# Patient Record
Sex: FEMALE | Race: WHITE | NOT HISPANIC OR LATINO | URBAN - METROPOLITAN AREA
[De-identification: names, ages, dates, MRNs, and addresses within clinical notes are randomized per-mention and may not be internally consistent; named-entity substitution may affect disease eponyms.]

---

## 2017-01-16 ENCOUNTER — ALLSCRIPTS OFFICE VISIT (OUTPATIENT)
Dept: OTHER | Facility: OTHER | Age: 52
End: 2017-01-16

## 2017-01-16 DIAGNOSIS — Z79.891 LONG TERM CURRENT USE OF OPIATE ANALGESIC: ICD-10-CM

## 2017-01-16 DIAGNOSIS — G89.4 CHRONIC PAIN SYNDROME: ICD-10-CM

## 2017-01-16 DIAGNOSIS — F11.20 UNCOMPLICATED OPIOID DEPENDENCE (HCC): ICD-10-CM

## 2017-01-17 ENCOUNTER — GENERIC CONVERSION - ENCOUNTER (OUTPATIENT)
Dept: OTHER | Facility: OTHER | Age: 52
End: 2017-01-17

## 2017-04-06 ENCOUNTER — ALLSCRIPTS OFFICE VISIT (OUTPATIENT)
Dept: OTHER | Facility: OTHER | Age: 52
End: 2017-04-06

## 2017-06-29 ENCOUNTER — ALLSCRIPTS OFFICE VISIT (OUTPATIENT)
Dept: OTHER | Facility: OTHER | Age: 52
End: 2017-06-29

## 2017-07-27 ENCOUNTER — GENERIC CONVERSION - ENCOUNTER (OUTPATIENT)
Dept: OTHER | Facility: OTHER | Age: 52
End: 2017-07-27

## 2017-07-31 ENCOUNTER — GENERIC CONVERSION - ENCOUNTER (OUTPATIENT)
Dept: OTHER | Facility: OTHER | Age: 52
End: 2017-07-31

## 2017-09-29 ENCOUNTER — GENERIC CONVERSION - ENCOUNTER (OUTPATIENT)
Dept: OTHER | Facility: OTHER | Age: 52
End: 2017-09-29

## 2018-01-12 NOTE — PROGRESS NOTES
Assessment    1  Chronic low back pain (724 2,338 29) (M54 5,G89 29)   2  Chronic cervical pain (723 1,338 29) (M54 2,G89 29)   3  Cervical post-laminectomy syndrome (722 81) (M96 1)   4  Chronic cervical radiculopathy (723 4) (M54 12)   5  Chronic lumbar radiculopathy (724 4) (M54 16)   6  Chronic myofascial pain (729 1,338 29) (M79 1,G89 29)   7  Chronic pain in shoulder, left (719 41,338 29) (M25 512,G89 29)   8  Long term current use of opiate analgesic (V58 69) (Z79 891)   9  Opioid dependence, continuous (304 01) (F11 20)   10  Pain syndrome, chronic (338 4) (G89 4)   11  Postlaminectomy syndrome of lumbar region (722 83) (M96 1)    Plan   Long term current use of opiate analgesic, Opioid dependence, continuous, Pain  syndrome, chronic    · Procedure Flowsheet; Status:Complete;   Done: 27USG9355 02:36PM   Performed: In Office; FSV:47JBB3105;ZSHMRCT; For:Long term current use of opiate analgesic, Opioid dependence, continuous, Pain syndrome, chronic; Ordered By:Glenn Waddell;  Pain syndrome, chronic    · HYDROmorphone HCl - 4 MG Oral Tablet; TAKE 1 TABLET Every 6 hours PRN for  pain   Rx By: Katie Iniguez; Dispense: 30 Days ; #:110 Tablet; Refill: 0; For: Pain syndrome, chronic; SELIN = N; Print Rx    Follow-up visit in 3 months Evaluation and Treatment  Follow-up  Status: Hold For - Scheduling  Requested for: 94QGL5583  Ordered; For: Pain syndrome, chronic;  Ordered By: Katie Iniguez  Performed:   Due: 80XXU4942     Discussion/Summary    While the patient was in the office today, I discussed with the patient that at this point time since she is noting moderate and stable relief, without any significant sciatic Effexor issues, however, it is obvious that she is not needing as much of the when necessary Dilaudid, that we will continue to slowly titrate her down on the Dilaudid over the next 3 months   Our goal at the next office visit would be to decrease the Dilaudid to 2 mg every 4-6 hours and then continue to slowly and steadily titrate her down from there  The patient was agreeable and verbalized an understanding  While the patient was in the office today, an annual review of the opioid contract/agreement was conducted, the patient was agreeable to continuing the contract, and an updated contracted was thoroughly reviewed and signed by the patient in the office today  A copy of the new contract was sent home with the patient for their records  A urine drug screen was collected at today's office visit as part of our medication management protocol  The point of care testing results were appropriate for what was being prescribed  The specimen will be sent for confirmatory testing  The drug screen is medically necessary because the patient is either dependent on opioid medication or is being considered for opioid medication therapy and the results could impact ongoing or future treatment  The drug screen is to evaluate for the presences or absence of prescribed, non-prescribed, and/or illicit drugs/substances  The patient was not picked for a pill count today, but she did bring her medications as required  The patient was given a 3 month supply of prescriptions with Do Not Fill date(s) of February 5, 2016, March 4, 2016, and April 1, 2016  The risk of opioid medications, including dependence, addiction and tolerance were explained to the patient  The patient understands and agrees to use these medications only as prescribed  I have fully discussed the potential side effects of the medication with the patient, which include, but are not limited to, constipation, drowsiness, addiction, impaired judgment and risk of fatal overdose as not taken as prescribed  I have warned the patient that sharing medications is a felony  I warned against driving while taking sedating medications  At this point in time, the patient is showing no signs of addiction, abuse, diversion or suicidal ideation     Possible side effects of new medications were reviewed with the patient/guardian today  The treatment plan was reviewed with the patient/guardian  The patient/guardian understands and agrees with the treatment plan   The patient was counseled regarding instructions for management, prognosis, patient and family education, impressions, risks and benefits of treatment options and importance of compliance with treatment  total time of encounter was 25 minutes  Chief Complaint    1  Pain  Continued neck pain, stable  Right sided low back and leg pain, stable  History of Present Illness  The patient presents today for a followup office visit  She is currently being treated for her chronic cervical pain and left upper extremity/shoulder pain and radicular symptoms status post a cervical fusion as well as her right-sided low back and lower extremity radicular symptoms and is status post a lumbar laminectomy 10 months ago with Dr Neo Ricks  She reports that since her last office visit, her pain symptoms have remained relatively stable and manageable, despite the slow and steady titration down on the pain medications  She reports that her current medication regimen is providing adequate and stable relief and understands that our goal to continue to try to slowly and steadily titrate her down, and hopefully, off of the opioid medications altogether  It does appear that the patient is going to be able to continue to titrate down her medication as she does have some extra pills left from her previous prescription  Belenda Kocher presents with complaints of gradual onset of constant episodes of moderate bilateral neck and bilateral lower back pain  On a scale of 1 to 10, the patient rates the pain as 6  Review of Systems    Constitutional: no fever, no recent weight gain and no recent weight loss  Eyes: no double vision and no blurry vision  Cardiovascular: no chest pain, no palpitations and no lower extremity edema  Respiratory: no complaints of shortness of breath and no wheezing  Musculoskeletal: no difficulty walking, no muscle weakness, no joint stiffness, no joint swelling, no limb swelling`, no pain in extremity and no decreased range of motion  Neurological: no dizziness, no difficulty swallowing, no memory loss, no loss of consciousness and no seizures  Gastrointestinal: no nausea, no vomiting, no constipation and no diarrhea  Genitourinary: no difficulty initiating urine stream, no genital pain and no frequent urination  Integumentary: no complaints of skin rash  Psychiatric: no depression  Endocrine: no excessive thirst, no adrenal disease, no hypothyroidism and no hyperthyroidism  Hematologic/Lymphatic: no tendency for easy bruising and no tendency for easy bleeding  Active Problems    1  Anticoagulated on Coumadin (V58 83,V58 61) (Z51 81,Z79 01)   2  Atypical chest pain (786 59) (R07 89)   3  Cervical post-laminectomy syndrome (722 81) (M96 1)   4  Chronic cervical pain (723 1,338 29) (M54 2,G89 29)   5  Chronic cervical radiculopathy (723 4) (M54 12)   6  Chronic low back pain (724 2,338 29) (M54 5,G89 29)   7  Chronic lumbar radiculopathy (724 4) (M54 16)   8  Chronic myofascial pain (729 1,338 29) (M79 1,G89 29)   9  Chronic pain in shoulder, left (719 41,338 29) (M25 512,G89 29)   10  Clavicle pain (733 90) (M89 8X1)   11  Herniated lumbar intervertebral disc (722 10) (M51 26)   12  Joint pain, knee (719 46) (M25 569)   13  Pain syndrome, chronic (338 4) (G89 4)   14  Postlaminectomy syndrome of lumbar region (722 83) (M96 1)    Past Medical History    1  History of Encounter for long-term (current) use of high-risk medication (V58 69)   (A83 433)    The active problems and past medical history were reviewed and updated today  Surgical History    1  History of  Section    The surgical history was reviewed and updated today  Family History    1   Family history of Diabetes Mellitus (V18 0)   2  Family history of Heart Disease (V17 49)   3  Family history of Hypertension (V17 49)    The family history was reviewed and updated today  Social History    · Denied: History of Alcohol Use (History)   · Never A Smoker   · Denied: History of Tobacco Use  The social history was reviewed and updated today  The social history was reviewed and is unchanged  Current Meds   1  Coumadin TABS Recorded   2  Enoxaparin Sodium 100 MG/ML Subcutaneous Solution; Therapy: 29Ejz0614 to (Evaluate:01Jun2015) Recorded   3  HYDROmorphone HCl - 4 MG Oral Tablet; TAKE 1 TABLET Every 6 hours PRN for pain; Therapy: 81RRP3162 to (Evaluate:09Dec2015); Last Rx:09Nov2015 Ordered   4  Lopressor TABS Recorded    The medication list was reviewed and updated today  Allergies    1  No Known Drug Allergies    Vitals  Vital Signs [Data Includes: Current Encounter]    Recorded: 19BGH8603 02:53PM   Temperature 98 7 F   Heart Rate 60   Respiration 16   Systolic 991   Diastolic 88   Height 5 ft 3 in   Weight 227 lb 8 0 oz   BMI Calculated 40 3   BSA Calculated 2 05   Pain Scale 6     Physical Exam    Constitutional   General appearance: Well developed, well nourished, alert, in no distress, non-toxic and no overt pain behavior  Eyes   Sclera: anicteric   HEENT   Hearing grossly intact  Neck   Neck: Abnormal   (Patient has a surgical Aspen collar on)   Pulmonary   Respiratory effort: Even and unlabored  Cardiovascular   Examination of extremities: No edema or pitting edema present  Abdomen   Abdomen: Abnormal       Skin   Skin and subcutaneous tissue: Normal without rashes or lesions, well hydrated  Posterior cervical fusion scar is clean, dry, intact, well approximated, without any signs or symptoms of infection, erythema, and or edema  Psychiatric   Mood and affect: Mood and affect appropriate  Neurologic   Cranial nerves: Cranial nerves II-XII grossly intact      the muscle tone was normal   Musculoskeletal Tandem Gait: Intact      Results/Data  Encounter Results   Procedure Flowsheet [id number] 03:51PM      Test Name Result Flag Reference   Balbuena's Depression Inventory 7     SOAP-R 8       Procedure Flowsheet 95RTY3605 02:36PM Farrah Camargo     Test Name Result Flag Reference   Urine Drug Screen Performed Date 83MWS3569         Future Appointments    Date/Time Provider Specialty Site   04/25/2016 03:15 PM [facility] [address]     Signatures   Electronically signed by : JEF Welch; Feb 2 2016  6:56AM EST                       (Author)    Electronically signed by : Ly Castillo DO; Feb 2 2016  8:03AM EST

## 2018-01-12 NOTE — MISCELLANEOUS
Message   Recorded as Task   Date: 07/31/2017 08:05 AM, Created By: Pato Cervantes   Task Name: Miscellaneous   Assigned To: Pato Cervantes   Regarding Patient: Catracho Mireles, Status: Active   CommentGlendmayra Navarrete - 31 Jul 2017 8:05 AM     TASK CREATED  Pt LM w/service to cx appt for today  She is feeling better  Glenn Waddell - 31 Jul 2017 8:30 AM     TASK REPLIED TO: Previously Assigned To Glenn Waddell  Provider aware  Thank you  Active Problems    1  Anticoagulated on Coumadin (V58 83,V58 61) (Z51 81,Z79 01)   2  Atypical chest pain (786 59) (R07 89)   3  Cervical post-laminectomy syndrome (722 81) (M96 1)   4  Chronic cervical pain (723 1,338 29) (M54 2,G89 29)   5  Chronic cervical radiculopathy (723 4) (M54 12)   6  Chronic low back pain (724 2,338 29) (M54 5,G89 29)   7  Chronic lumbar radiculopathy (724 4) (M54 16)   8  Chronic myofascial pain (729 1,338 29) (M79 1,G89 29)   9  Chronic pain in left shoulder (719 41,338 29) (M25 512,G89 29)   10  Chronic pain in right shoulder (719 41,338 29) (M25 511,G89 29)   11  Clavicle pain (733 90) (M89 8X1)   12  Herniated lumbar intervertebral disc (722 10) (M51 26)   13  Joint pain, knee (719 46) (M25 569)   14  Long term current use of opiate analgesic (V58 69) (Z79 891)   15  Opioid dependence, continuous (304 01) (F11 20)   16  Pain syndrome, chronic (338 4) (G89 4)   17  Postlaminectomy syndrome of lumbar region (722 83) (M96 1)    Current Meds   1  Coumadin TABS (Warfarin Sodium) Recorded   2  Gabapentin 400 MG Oral Capsule; TAKE 1 CAPSULE AT BEDTIME; Therapy: 75JAQ8845 to (Evaluate:58Bsl2590)  Requested for: 48Uuh0090; Last   Rx:44Qtk6491 Ordered   3  HYDROmorphone HCl - 2 MG Oral Tablet; Take 1 tablet every 4-6 hours PRN, max 5 per   day; Therapy: 97DGR5205 to (Evaluate:99Cnm1053); Last Rx:29Jun2017 Ordered   4  Lopressor TABS (Metoprolol Tartrate) Recorded   5   Medrol 4 MG Oral Tablet Therapy Pack (MethylPREDNISolone); take as directed; Therapy: 30AXK8882 to (Evaluate:27Sxb3597)  Requested for: 24Ixh3624; Last   Rx:42Yzs8179 Ordered    Allergies    1  No Known Drug Allergies    Signatures   Electronically signed by :  Rosine Curling, ; Jul 31 2017  8:35AM EST                       (Author)

## 2018-01-14 VITALS
HEIGHT: 63 IN | WEIGHT: 245 LBS | DIASTOLIC BLOOD PRESSURE: 88 MMHG | HEART RATE: 64 BPM | BODY MASS INDEX: 43.41 KG/M2 | TEMPERATURE: 98.5 F | SYSTOLIC BLOOD PRESSURE: 144 MMHG

## 2018-01-14 VITALS
DIASTOLIC BLOOD PRESSURE: 88 MMHG | BODY MASS INDEX: 43.59 KG/M2 | TEMPERATURE: 98.4 F | WEIGHT: 246 LBS | SYSTOLIC BLOOD PRESSURE: 148 MMHG | HEIGHT: 63 IN | HEART RATE: 72 BPM

## 2018-01-15 VITALS
BODY MASS INDEX: 43.59 KG/M2 | RESPIRATION RATE: 18 BRPM | WEIGHT: 246 LBS | SYSTOLIC BLOOD PRESSURE: 152 MMHG | HEIGHT: 63 IN | DIASTOLIC BLOOD PRESSURE: 96 MMHG | HEART RATE: 68 BPM

## 2018-01-15 NOTE — MISCELLANEOUS
Message   Recorded as Task   Date: 10/25/2016 12:57 PM, Created By: Han Melendez   Task Name: Follow Up   Assigned To: [facility] [place] [facility]   Regarding Patient: Cheryle Denis, Status: In Progress   Comment:    Glenn Waddell - 25 Oct 2016 12:57 PM     TASK CREATED  Please call the patient and advise her that her b/l shoulder x-rays were normal  At this point I would recommend physical therapy  Let me know if she is interested so I can put in a script  MainSanti - 25 Oct 2016 1:36 PM     TASK EDITED  m 10/25/2016 @ 66 135 36 14  Pt calling for x-ray results  Pls cb at [facility]   Christiano Quachrachelle - 25 Oct 2016 2:41 PM     TASK EDITED  Left a detailed message on machine advising of above  As per release of info on file  Provided cb  number for pt's response  Christiano Quachrachelle - 28 Oct 2016 3:43 PM     TASK EDITED   s/w pt, advised of above  Pt verbalized understanding  No PT at this time  Confirmed ov w/ DG on 1/16/17 at 1300  Active Problems    1  Anticoagulated on Coumadin (V58 83,V58 61) (Z51 81,Z79 01)   2  Atypical chest pain (786 59) (R07 89)   3  Cervical post-laminectomy syndrome (722 81) (M96 1)   4  Chronic cervical pain (723 1,338 29) (M54 2,G89 29)   5  Chronic cervical radiculopathy (723 4) (M54 12)   6  Chronic low back pain (724 2,338 29) (M54 5,G89 29)   7  Chronic lumbar radiculopathy (724 4) (M54 16)   8  Chronic myofascial pain (729 1,338 29) (M79 1,G89 29)   9  Chronic pain in left shoulder (719 41,338 29) (M25 512,G89 29)   10  Chronic pain in right shoulder (719 41,338 29) (M25 511,G89 29)   11  Clavicle pain (733 90) (M89 8X1)   12  Herniated lumbar intervertebral disc (722 10) (M51 26)   13  Joint pain, knee (719 46) (M25 569)   14  Long term current use of opiate analgesic (V58 69) (Z79 891)   15  Opioid dependence, continuous (304 01) (F11 20)   16  Pain syndrome, chronic (338 4) (G89 4)   17  Postlaminectomy syndrome of lumbar region (582 83) (M96 1)    Current Meds   1  Coumadin TABS (Warfarin Sodium) Recorded   2  HYDROmorphone HCl - 2 MG Oral Tablet; Take 1 tablet every 4-6 hours PRN, max 5 per   day; Therapy: 31FNO5326 to (Evaluate:16Nov2016); Last Rx:17Oct2016 Ordered   3  Lopressor TABS (Metoprolol Tartrate) Recorded    Allergies    1   No Known Drug Allergies    Signatures   Electronically signed by : Tere Leger, ; Oct 28 2016  3:43PM EST                       (Author)

## 2018-01-15 NOTE — MISCELLANEOUS
Message   Recorded as Task   Date: 01/17/2017 07:18 AM, Created By: Jan Franks   Task Name: Follow Up   Assigned To: [facility] [place] [facility]   Regarding Patient: Pawan Vásquez, Status: Active   CommentCarridi Shah - 17 Jan 2017 7:18 AM     TASK CREATED  Can you please call the patient's [facility] @ [phone] to find out if her dilaudid PRN needs a pre-auth and who it needs it through? Also can you get an ID for her prescription coverage and who provides her presc coverage  Thank you  Nissa Mars - 17 Jan 2017 10:40 AM     TASK EDITED    ***FYI***  S/w pharmacist regarding above  Pharmacist stated that the pt's dilauded does not require a prior auth but her copay is 43$ Her member ID # is [id number] and the provider is Glenn Prieto - 17 Jan 2017 11:41 AM     TASK REPLIED TO: Previously Assigned To Glenn Waddell  Provider aware  Thank you  Active Problems   1  Anticoagulated on Coumadin (V58 83,V58 61) (Z51 81,Z79 01)  2  Atypical chest pain (786 59) (R07 89)  3  Cervical post-laminectomy syndrome (722 81) (M96 1)  4  Chronic cervical pain (723 1,338 29) (M54 2,G89 29)  5  Chronic cervical radiculopathy (723 4) (M54 12)  6  Chronic low back pain (724 2,338 29) (M54 5,G89 29)  7  Chronic lumbar radiculopathy (724 4) (M54 16)  8  Chronic myofascial pain (729 1,338 29) (M79 1,G89 29)  9  Chronic pain in left shoulder (719 41,338 29) (M25 512,G89 29)  10  Chronic pain in right shoulder (719 41,338 29) (M25 511,G89 29)  11  Clavicle pain (733 90) (M89 8X1)  12  Herniated lumbar intervertebral disc (722 10) (M51 26)  13  Joint pain, knee (719 46) (M25 569)  14  Long term current use of opiate analgesic (V58 69) (Z79 891)  15  Opioid dependence, continuous (304 01) (F11 20)  16  Pain syndrome, chronic (338 4) (G89 4)  17  Postlaminectomy syndrome of lumbar region (722 83) (M96 1)    Current Meds  1  Coumadin TABS (Warfarin Sodium) Recorded  2   HYDROmorphone HCl - 2 MG Oral Tablet; Take 1 tablet every 4-6 hours PRN, max 5 per   day; Therapy: 75PUK5539 to (Evaluate:65Cqe1513); Last Rx:16Jan2017 Ordered  3  Lopressor TABS (Metoprolol Tartrate) Recorded    Allergies   1   No Known Drug Allergies    Signatures   Electronically signed by : Randee Wylie RN; Jan 17 2017 11:59AM EST                       (Author)

## 2018-01-16 NOTE — RESULT NOTES
Message   Recorded as Task   Date: 04/25/2016 11:50 AM, Created By: Arnaldo Palencia   Task Name: Med Renewal Request   Assigned To: [facility] [place] [facility]   Regarding Patient: Petra Degroot, Status: Active   Comment:    Britni Ramos - 25 Apr 2016 11:50 AM     TASK CREATED  Caller: Self; Renew Medication; [phone] (Home)  S/w pt  after receiving vmlom stating she had to cancel her appt  today, but will need more meds before her next povs  Verifed with the pt  that she continues on the Hydromorphone 4mg tid to qid for pain  Pt  states she started with B/L shoulder pain and she is having an MRI today  Clarified with the pt  that per contract for narcotic refills, she would need a povs before her medications run out  That is her respondsibility and would question DG for refill before povs  DG to advise  Thanks  Next povs is on 5/4  Glenn Waddell - 25 Apr 2016 11:55 AM     TASK REPLIED TO: Previously Assigned To [facility] [place] [facility]  Please advise her as a one time exception there is a 10 day supply script of her medications available for  at the  to get her to her OV as schedfranciscod  Britni Ramos - 25 Apr 2016 4:02 PM     TASK EDITED  S/w pt  and she is aware          Signatures   Electronically signed by : Kala Allen, ; Apr 25 2016  4:02PM EST                       (Author)

## 2018-01-17 NOTE — MISCELLANEOUS
Message   Recorded as Task   Date: 07/27/2017 03:03 PM, Created By: Elmo Ayers   Task Name: Miscellaneous   Assigned To: [facility] [place] [facility]   Regarding Patient: Melo Laguerre, Status: Active   Comment:    Elmo Ayers - 27 Jul 2017 3:03 PM     TASK CREATED  Pt called the service having neck pain  I s/w Glenn he said to put pt in next available spot (which is 7/31/17)  Pt took that appt but wants to know what she should do from now until then for the pain  Glenn suggested pt speak to the nurse  Pt can be reached at   [phone]  Please call her  Thank You! Santi Quach - 27 Jul 2017 3:50 PM     TASK EDITED  s/w pt, states she has had increased pain in neck and down L shoulder, L arm to hand since tuesday  Worse yesterday, continues today  Per pt, no relief w/ rest, ice / heat  Min relief w/ dilaudid as rx'd  Pt stated that she called her surgeon b/c the pain started at the base of her surgical scar  Surgeon advised - updated imaging is required  Pt saw PCP, who advised that they cannot do anything because the pt is being followed by [facility]  Pt questioned how to proceed at this point  Confirmed ov of 7/31 w/ DG  has hydromorphone on hand #35 tabs and rx w/ DNF of 7/27  Pt stated that there are frequently delys in filling this rx due to availablity  Advised pt, will d/w DG and cb to advise  pt verbalized understanding and appreciation  Glenn Waddell - 27 Jul 2017 3:54 PM     TASK REPLIED TO: Previously Assigned To Glenn Waddell  Please advise the patient that at this point I feel there is a significant inflammatory component and I'm going to have her try Medrol Dosepak, which I will be sent to her pharmacy  She should not take any other oral NSAIDs except for significant Tylenol on this medication  I also feel there is a significant neuropathic component and to help her sleep I sent a prescription for gabapentin 400 mg one by mouth at bedtime to her pharmacy   She should proceed with her when necessary Dilaudid as prescribed and that does not seem to be helping, I do not feel increasing it would be much helpful either  She should try this since our office visit on Monday we will regroup at that point  Santi Quach - 27 Jul 2017 4:20 PM     TASK EDITED  s/w pt, advised of above  pt stated that the pcp ordered an oral steroid, "forgot to tell you that " Pt states she has also tried gabapentin in the past w/ se's - drowsiness, loss of appetite    advised by doctor to stop gabapentin  Advised pt, will cancel that rx as well  Confirmed 7/31 ov, will fu at that time  Pt verbalized understanding  DG aware  Santi Quach - 27 Jul 2017 4:23 PM     TASK EDITED  medrol dose pack and gabapentin cx'd at [facility]        Active Problems    1  Anticoagulated on Coumadin (V58 83,V58 61) (Z51 81,Z79 01)   2  Atypical chest pain (786 59) (R07 89)   3  Cervical post-laminectomy syndrome (722 81) (M96 1)   4  Chronic cervical pain (723 1,338 29) (M54 2,G89 29)   5  Chronic cervical radiculopathy (723 4) (M54 12)   6  Chronic low back pain (724 2,338 29) (M54 5,G89 29)   7  Chronic lumbar radiculopathy (724 4) (M54 16)   8  Chronic myofascial pain (729 1,338 29) (M79 1,G89 29)   9  Chronic pain in left shoulder (719 41,338 29) (M25 512,G89 29)   10  Chronic pain in right shoulder (719 41,338 29) (M25 511,G89 29)   11  Clavicle pain (733 90) (M89 8X1)   12  Herniated lumbar intervertebral disc (722 10) (M51 26)   13  Joint pain, knee (719 46) (M25 569)   14  Long term current use of opiate analgesic (V58 69) (Z79 891)   15  Opioid dependence, continuous (304 01) (F11 20)   16  Pain syndrome, chronic (338 4) (G89 4)   17  Postlaminectomy syndrome of lumbar region (722 83) (M96 1)    Current Meds   1  Coumadin TABS (Warfarin Sodium) Recorded   2  Gabapentin 400 MG Oral Capsule; TAKE 1 CAPSULE AT BEDTIME; Therapy: 64QSI7336 to (Evaluate:54Vbc3835)  Requested for: 93Tko5592; Last   Rx:27Yan4779 Ordered   3  HYDROmorphone HCl - 2 MG Oral Tablet; Take 1 tablet every 4-6 hours PRN, max 5 per   day; Therapy: 42DAO5808 to (Evaluate:27Pwo7801); Last Rx:29Jun2017 Ordered   4  Lopressor TABS (Metoprolol Tartrate) Recorded   5  Medrol 4 MG Oral Tablet Therapy Pack (MethylPREDNISolone); take as directed; Therapy: 18AWM3708 to (Evaluate:05Chj2418)  Requested for: 88Bti7299; Last   Rx:50Atp9695 Ordered    Allergies    1   No Known Drug Allergies    Signatures   Electronically signed by : Jameel Kulkarni, ; Jul 27 2017  4:24PM EST                       (Author)

## 2018-01-17 NOTE — MISCELLANEOUS
Message   Recorded as Task   Date: 09/28/2017 03:25 PM, Created By: Romero Hannah   Task Name: Miscellaneous   Assigned To: Romero Hannah   Regarding Patient: Nico Tompkins, Status: Active   Comment:    Fay Constantino - 28 Sep 2017 3:25 PM     TASK CREATED  PT called back to cx her appt for monday  She stated she was over booked  I did check and saw that  She stated she doesnt have a ride  I offered her a few other days and times I thought would work to be over booked  nothing seemed to work for her bc she would have ask her ride she kept saying  I asked her if she wanted to call her ride and see if they had and days or times that might work better that we could look at your schedule and see if we could make something work  At first she wouldnt answer me she just sat there in quite  I asked her if that would help again and she said ummm no-- his mom has lung cancer and i dont think he knows what would work  I advised her to call us when she thought she might have and answer and told her i would make you aware  She stated ok then fine bye?! At this time patient is not schedule for a f/u  Glenn Waddell - 28 Sep 2017 3:28 PM     TASK REPLIED TO: Previously Assigned To Jaymie Teixeira  Provider aware  Thank you for your efforts  Active Problems    1  Anticoagulated on Coumadin (V58 83,V58 61) (Z51 81,Z79 01)   2  Atypical chest pain (786 59) (R07 89)   3  Cervical post-laminectomy syndrome (722 81) (M96 1)   4  Chronic cervical pain (723 1,338 29) (M54 2,G89 29)   5  Chronic cervical radiculopathy (723 4) (M54 12)   6  Chronic low back pain (724 2,338 29) (M54 5,G89 29)   7  Chronic lumbar radiculopathy (724 4) (M54 16)   8  Chronic myofascial pain (729 1,338 29) (M79 1,G89 29)   9  Chronic pain in left shoulder (719 41,338 29) (M25 512,G89 29)   10  Chronic pain in right shoulder (719 41,338 29) (M25 511,G89 29)   11  Clavicle pain (733 90) (M89 8X1)   12   Herniated lumbar intervertebral disc (722 10) (M51 26)   13  Joint pain, knee (719 46) (M25 569)   14  Long term current use of opiate analgesic (V58 69) (Z79 891)   15  Opioid dependence, continuous (304 01) (F11 20)   16  Pain syndrome, chronic (338 4) (G89 4)   17  Postlaminectomy syndrome of lumbar region (722 83) (M96 1)    Current Meds   1  Coumadin TABS (Warfarin Sodium) Recorded   2  Gabapentin 400 MG Oral Capsule; TAKE 1 CAPSULE AT BEDTIME; Therapy: 52MXZ8086 to (Evaluate:44Aoh6130)  Requested for: 52Vdv8593; Last   Rx:53Qqm5697 Ordered   3  HYDROmorphone HCl - 2 MG Oral Tablet; Take 1 tablet every 4-6 hours PRN, max 5 per   day; Therapy: 32EZR0329 to (Evaluate:15Ngh1076); Last Rx:98Qxh9883 Ordered   4  Lopressor TABS (Metoprolol Tartrate) Recorded   5  Medrol 4 MG Oral Tablet Therapy Pack (MethylPREDNISolone); take as directed; Therapy: 06AQJ5750 to (Evaluate:58Cru2282)  Requested for: 97Lrt7515; Last   Rx:13Rai1352 Ordered    Allergies    1   No Known Drug Allergies    Signatures   Electronically signed by : Jack Kirk, ; Sep 29 2017  8:50AM EST                       (Author)

## 2018-01-17 NOTE — MISCELLANEOUS
Message   Recorded as Task   Date: 04/28/2016 01:04 PM, Created By: Gurjit St   Task Name: Follow Up   Assigned To: [facility]   Regarding Patient: Zechariah Norris, Status: Active   Comment:    Santi Quach - 28 Apr 2016 1:04 PM     TASK CREATED  Caller: Self; General Medical Question; [phone] (Home)  m 4/28/2016 @ 1203  10 day supply of medication for  at the office  Pt states she has not been able to  up yet  Pt is questioning - how should I take my medicine? MainSanti - 28 Apr 2016 3:51 PM     TASK EDITED  s/w pt, confirmed ov of 5/4/2016 at 1330 w/ DG  Pt states she has 10 tabs of hydromorphone 4 mg on hand  States she does not drive and has not been able to get a ride to  her rx at the   Pt is questioning how to proceed w/ her medications  Advised pt, will d/w Dr Angie Navarrete - space medication out to last until 5/4/2016 ov  Encouraged pt to find a ride / call a cab to  rx between 8-4 mon - fri      d/w Dr Angie Navarrete - agreed    S/w pt, confirmed above  pt verbalizedunderstanding  Active Problems    1  Anticoagulated on Coumadin (V58 83,V58 61) (Z51 81,Z79 01)   2  Atypical chest pain (786 59) (R07 89)   3  Cervical post-laminectomy syndrome (722 81) (M96 1)   4  Chronic cervical pain (723 1,338 29) (M54 2,G89 29)   5  Chronic cervical radiculopathy (723 4) (M54 12)   6  Chronic low back pain (724 2,338 29) (M54 5,G89 29)   7  Chronic lumbar radiculopathy (724 4) (M54 16)   8  Chronic myofascial pain (729 1,338 29) (M79 1,G89 29)   9  Chronic pain in shoulder, left (719 41,338 29) (M25 512,G89 29)   10  Clavicle pain (733 90) (M89 8X1)   11  Herniated lumbar intervertebral disc (722 10) (M51 26)   12  Joint pain, knee (719 46) (M25 569)   13  Long term current use of opiate analgesic (V58 69) (Z79 891)   14  Opioid dependence, continuous (304 01) (F11 20)   15  Pain syndrome, chronic (338 4) (G89 4)   16   Postlaminectomy syndrome of lumbar region (768 83) (M96 1)    Current Meds   1  Coumadin TABS (Warfarin Sodium) Recorded   2  Enoxaparin Sodium 100 MG/ML Subcutaneous Solution; Therapy: 15Apr2015 to (Evaluate:01Jun2015) Recorded   3  HYDROmorphone HCl - 4 MG Oral Tablet; TAKE 1 TABLET Every 6 hours PRN for pain; Therapy: 68FOO8907 to (Evaluate:05May2016); Last Rx:25Apr2016 Ordered   4  Lopressor TABS (Metoprolol Tartrate) Recorded    Allergies    1   No Known Drug Allergies    Signatures   Electronically signed by : Beatriz Nichols, ; Apr 28 2016  3:52PM EST                       (Author)

## 2018-01-18 NOTE — MISCELLANEOUS
Message   Recorded as Task   Date: 2017 10:50 AM, Created By: Cornell Cheng   Task Name: Miscellaneous   Assigned To: [facility]   Regarding Patient: Jaja Laughlin, Status: Active   Comment:    Cornell Cheng - 29 Sep 2017 10:50 AM     TASK CREATED  Pt called and left message w/ service:        Moon Cervantes      Phone: [phone]   Ext:      Pt Name: Jose Eduardo Ok     Pt : 1965      Message: QUESTION ABOUT RIDE FOR APPT AND GETTING MEDICATION UNTIL             SHE CAN SEE THE Santi Rodríguez - 29 Sep 2017 11:06 AM     TASK EDITED  s/w pt, states she is going to run out of medication on 10/5  Cannot get a ride to an ov w/ DG on 10/2 or an emergency slot on 10/3  pt is requesting that this office call her pcp and request that the pcp write a rx for 1 mo of dilauded - 1x exception  Per pt, her pcp is closer to home and has later office hours  Advised pt, it is unlikely that this office will be able to accomodate that request  Will d/w DG  Pt stated that she will probably need to change pain management doctors to someone closer to home  Advised pt, she may want to ask her pcp for some names that he would refer her to  Again, will d/w DG and cb to advise  Pt verbalized understanding   Glenn Waddell - 29 Sep 2017 11:12 AM     TASK REPLIED TO: Previously Assigned To Glenn Waddell  I can give her a 30 day script with a continued slow wean fo the dilaudid  The first 2 weeks it will be 1 PO QID and the second 2 weeks, 1 PO TID PRN  This is a one time exception and after that she will need to be seen in the office for a follow up OV or find a closer PMProvider  The script will be ready for  today  Cleo Block - 29 Sep 2017 11:32 AM     TASK EDITED  Spoke with pt made her aware that DG wants her to go off a slow wean off the diiaudid   When i told the pt that she has to come and  the script at the office she becamw very upset"how do i get their i do not drive" asked the pt if she had someone that could bring her she stated no  I explained to her that these camron medications can not be mailed or called into the pharmacy  pt states im not going to listen to the weaning schedule because i can not get the pills  Pt became upset and stated that, why cant DG call her pcp to see if they would order this medication   MainSanti - 29 Sep 2017 11:39 AM     TASK EDITED  LMOM to cb on cb number provided  **Will advise pt, this office cannot ask another provider to write a rx  It is at the providers discretion  Pt may find someone to pu her rx today w/ a weaning schedule to get her to a new pain provider  Or, she can keep her ov on Monday  or pt can call her pcp and request a rx - effectively discharging herself from [address] Glenn Rojas - 29 Sep 2017 11:42 AM     TASK REPLIED TO: Previously Assigned To [facility] Client  Provider aware  The script and a list of other pain management providers is waiting for her upfront  Thank you  Raysa Lozada - 29 Sep 2017 12:54 PM     TASK EDITED  received  Reason: ROUTINE/OFFICE   Pt's Edy Parkinson       For: OFFICE     2nd Call: NO        From: [facility]     Phone: [phone]   Ext:     Pt Name: [facility]    Pt : 1965     Message: [address]  NEEDS CALL BACK  -----------------------------------------------------------------     CALLER ID: [id number]      CSN: [id number]      Taken By: NS 2017 11:54 AM   answering service message:   Riya Lanier - 29 Sep 2017 1:04 PM     TASK EDITED  spoke to pt and made her aware  that if she can get someone to bring her to the office that the  script and a list of other area pain specalist would be their  Alos made pt aware that it is her responsiblity to call her pcp to see if she would take over providing the script for the dilauid   Active Problems    1   Anticoagulated on Coumadin (V58 83,V58 61) (Z51 81,Z79 01)   2  Atypical chest pain (786 59) (R07 89)   3  Cervical post-laminectomy syndrome (722 81) (M96 1)   4  Chronic cervical pain (723 1,338 29) (M54 2,G89 29)   5  Chronic cervical radiculopathy (723 4) (M54 12)   6  Chronic low back pain (724 2,338 29) (M54 5,G89 29)   7  Chronic lumbar radiculopathy (724 4) (M54 16)   8  Chronic myofascial pain (729 1,338 29) (M79 1,G89 29)   9  Chronic pain in left shoulder (719 41,338 29) (M25 512,G89 29)   10  Chronic pain in right shoulder (719 41,338 29) (M25 511,G89 29)   11  Clavicle pain (733 90) (M89 8X1)   12  Herniated lumbar intervertebral disc (722 10) (M51 26)   13  Joint pain, knee (719 46) (M25 569)   14  Long term current use of opiate analgesic (V58 69) (Z79 891)   15  Opioid dependence, continuous (304 01) (F11 20)   16  Pain syndrome, chronic (338 4) (G89 4)   17  Postlaminectomy syndrome of lumbar region (722 83) (M96 1)    Current Meds   1  Coumadin TABS (Warfarin Sodium) Recorded   2  HYDROmorphone HCl - 2 MG Oral Tablet; Take 1 PO QID x 2 weeks, then 1 PO TID   PRN; Therapy: 94ZWT8919 to (Diego Ou); Last Rx:[id number] Ordered   3  Lopressor TABS (Metoprolol Tartrate) Recorded    Allergies    1   No Known Drug Allergies    Signatures   Electronically signed by : Richi Esteban, ; Sep 29 2017  1:05PM EST                       (Author)

## 2018-10-17 ENCOUNTER — TELEPHONE (OUTPATIENT)
Dept: PAIN MEDICINE | Facility: CLINIC | Age: 53
End: 2018-10-17

## 2019-02-25 ENCOUNTER — TELEPHONE (OUTPATIENT)
Dept: PAIN MEDICINE | Facility: CLINIC | Age: 54
End: 2019-02-25

## 2019-03-05 ENCOUNTER — TELEPHONE (OUTPATIENT)
Dept: PAIN MEDICINE | Facility: CLINIC | Age: 54
End: 2019-03-05